# Patient Record
Sex: MALE
[De-identification: names, ages, dates, MRNs, and addresses within clinical notes are randomized per-mention and may not be internally consistent; named-entity substitution may affect disease eponyms.]

---

## 2018-07-01 ENCOUNTER — HOSPITAL ENCOUNTER (EMERGENCY)
Dept: HOSPITAL 97 - ER | Age: 43
Discharge: HOME | End: 2018-07-01
Payer: COMMERCIAL

## 2018-07-01 DIAGNOSIS — E78.00: ICD-10-CM

## 2018-07-01 DIAGNOSIS — R42: Primary | ICD-10-CM

## 2018-07-01 LAB
ALBUMIN SERPL BCP-MCNC: 4.4 G/DL (ref 3.4–5)
ALP SERPL-CCNC: 63 U/L (ref 45–117)
ALT SERPL W P-5'-P-CCNC: 100 U/L (ref 12–78)
AST SERPL W P-5'-P-CCNC: 73 U/L (ref 15–37)
BUN BLD-MCNC: 13 MG/DL (ref 7–18)
CKMB CREATINE KINASE MB: 2 NG/ML (ref 0.3–3.6)
GLUCOSE SERPLBLD-MCNC: 91 MG/DL (ref 74–106)
HCT VFR BLD CALC: 46.8 % (ref 39.6–49)
INR BLD: 1.09
LYMPHOCYTES # SPEC AUTO: 1.7 K/UL (ref 0.7–4.9)
MAGNESIUM SERPL-MCNC: 2.2 MG/DL (ref 1.8–2.4)
MCH RBC QN AUTO: 27.7 PG (ref 27–35)
MCV RBC: 85.5 FL (ref 80–100)
NT-PROBNP SERPL-MCNC: 9 PG/ML (ref ?–125)
PMV BLD: 9.5 FL (ref 7.6–11.3)
POTASSIUM SERPL-SCNC: 4.2 MMOL/L (ref 3.5–5.1)
RBC # BLD: 5.48 M/UL (ref 4.33–5.43)

## 2018-07-01 PROCEDURE — 84484 ASSAY OF TROPONIN QUANT: CPT

## 2018-07-01 PROCEDURE — 71045 X-RAY EXAM CHEST 1 VIEW: CPT

## 2018-07-01 PROCEDURE — 96374 THER/PROPH/DIAG INJ IV PUSH: CPT

## 2018-07-01 PROCEDURE — 80076 HEPATIC FUNCTION PANEL: CPT

## 2018-07-01 PROCEDURE — 83880 ASSAY OF NATRIURETIC PEPTIDE: CPT

## 2018-07-01 PROCEDURE — 85730 THROMBOPLASTIN TIME PARTIAL: CPT

## 2018-07-01 PROCEDURE — 36415 COLL VENOUS BLD VENIPUNCTURE: CPT

## 2018-07-01 PROCEDURE — 81003 URINALYSIS AUTO W/O SCOPE: CPT

## 2018-07-01 PROCEDURE — 93005 ELECTROCARDIOGRAM TRACING: CPT

## 2018-07-01 PROCEDURE — 83735 ASSAY OF MAGNESIUM: CPT

## 2018-07-01 PROCEDURE — 99285 EMERGENCY DEPT VISIT HI MDM: CPT

## 2018-07-01 PROCEDURE — 82550 ASSAY OF CK (CPK): CPT

## 2018-07-01 PROCEDURE — 85025 COMPLETE CBC W/AUTO DIFF WBC: CPT

## 2018-07-01 PROCEDURE — 80048 BASIC METABOLIC PNL TOTAL CA: CPT

## 2018-07-01 PROCEDURE — 82553 CREATINE MB FRACTION: CPT

## 2018-07-01 PROCEDURE — 85610 PROTHROMBIN TIME: CPT

## 2018-07-01 NOTE — RAD REPORT
EXAM DESCRIPTION:  RAD - Chest Single View - 7/1/2018 2:09 pm

 

CLINICAL HISTORY:  dizziness;SOB

Chest pain.

 

COMPARISON:  <Comparisons>

 

FINDINGS:  Portable technique limits examination quality.

 

The lungs are grossly clear. The heart is normal in size. No displaced fractures.

 

IMPRESSION:  No acute intrathoracic process suspected.

## 2018-07-01 NOTE — EKG
Test Date:    2018-07-01               Test Time:    13:35:17

Technician:   GRACIELA                                    

                                                     

MEASUREMENT RESULTS:                                       

Intervals:                                           

Rate:         86                                     

VT:           164                                    

QRSD:         88                                     

QT:           376                                    

QTc:          449                                    

Axis:                                                

P:            40                                     

VT:           164                                    

QRS:          34                                     

T:            19                                     

                                                     

INTERPRETIVE STATEMENTS:                                       

                                                     

Normal sinus rhythm with sinus arrhythmia

Normal ECG

No previous ECG available for comparison



Electronically Signed On 07-01-18 21:58:48 CDT by Shahram Charles

## 2018-07-01 NOTE — ER
Nurse's Notes                                                                                     

 [facility]                                                                

Name: Jose Carmen                                                                           

Age: 43 yrs                                                                                       

Sex: Male                                                                                         

: 1975                                                                                   

MRN: [id number]                                                                                   

Arrival Date: 2018                                                                          

Time: 12:28                                                                                       

Account#: [id number]                                                                            

Bed 16                                                                                            

Private MD: Out, [facility]                                                                    

Diagnosis: Dizziness                                                                              

                                                                                                  

Presentation:                                                                                     

                                                                                             

12:34 Presenting complaint: Wife states: "Hes been feeling bad this morning, like he can't    lk1 

      breath properly.". Transition of care: patient was not received from another setting of     

      care. Onset of symptoms was 2018 at 09:00. Risk Assessment: Do you want to         

      hurt yourself or someone else? Patient reports no desire to harm self or others.            

      Initial Sepsis Screen: Does the patient meet any 2 criteria? No. Patient's initial          

      sepsis screen is negative. Does the patient have a suspected source of infection? No.       

      Patient's initial sepsis screen is negative. Care prior to arrival: None.                   

12:34 Method Of Arrival: Ambulatory                                                           lk1 

12:34 Acuity: NELIA 3                                                                           lk1 

                                                                                                  

Triage Assessment:                                                                                

12:40 Respiratory: the patient has mild shortness of breath.                                  rb1 

12:40 Respiratory: Reports shortness of breath since this morning.                            rb1 

15:13 Respiratory: Onset: The symptoms/episode began/occurred.                                rb1 

                                                                                                  

Historical:                                                                                       

- Allergies:                                                                                      

12:35 No Known Allergies;                                                                     lk1 

- PMHx:                                                                                           

12:35 High Cholesterol;                                                                       lk1 

- PSHx:                                                                                           

12:35 Carpal Tunnel Repair;                                                                   lk1 

                                                                                                  

- Immunization history:: Adult Immunizations up to date.                                          

- Social history:: Smoking status: Patient/guardian denies using tobacco.                         

- Ebola Screening: : No symptoms or risks identified at this time.                                

                                                                                                  

                                                                                                  

Screenin:40 Abuse screen: Denies threats or abuse. Nutritional screening: No deficits noted.        rb1 

      Tuberculosis screening: No symptoms or risk factors identified. Fall Risk None              

      identified.                                                                                 

                                                                                                  

Assessment:                                                                                       

12:40 General: Appears uncomfortable, obese, Behavior is calm, cooperative, Denies fever.     rb1 

      Neuro: Level of Consciousness is awake, alert, obeys commands, Oriented to person,          

      place, time, situation. Cardiovascular: Capillary refill < 3 seconds is brisk in            

      bilateral fingers. Respiratory: Airway is patent Respiratory effort is even, unlabored,     

      Respiratory pattern is regular, symmetrical, Breath sounds are clear bilaterally. GI:       

      No signs and/or symptoms were reported involving the gastrointestinal system. : No        

      signs and/or symptoms were reported regarding the genitourinary system. Derm: Skin is       

      dry, Skin is normal, Skin temperature is warm.                                              

12:40 Pain: Denies pain. Cardiovascular: Rhythm is regular.                                   rb1 

13:40 Reassessment: Patient appears in no apparent distress at this time. No changes from     rb1 

      previously documented assessment.                                                           

14:35 Reassessment: Patient appears in no apparent distress at this time. Patient and/or      rb1 

      family updated on plan of care and expected duration. Pain level reassessed. Patient is     

      alert, oriented x 3, equal unlabored respirations, skin warm/dry/pink. Patient states       

      feeling better.                                                                             

                                                                                                  

Vital Signs:                                                                                      

12:35  / 100; Pulse 99; Resp 18; Temp 97.1(TE); Pulse Ox 98% on R/A; Weight 113.4 kg    lk1 

      (R); Height 5 ft. 7 in. (170.18 cm) (R); Pain 0/10;                                         

13:55  / 92 RA Supine (auto/lg); Pulse 81 MON; Resp 20; Pulse Ox 99% ;                  jp3 

14:00  / 90 RA Sitting (auto/lg); Pulse 80 MON; Resp 19 S; Pulse Ox 100% ;              jp3 

14:05  / 93 RA Standing (auto/lg); Pulse 82 MON; Resp 19 S; Pulse Ox 100% ;             jp3 

15:00  / 89; Pulse 97; Resp 19; Pulse Ox 100% on R/A;                                   rb1 

12:35 Body Mass Index 39.16 (113.40 kg, 170.18 cm)                                            lk1 

                                                                                                  

ED Course:                                                                                        

12:28 Patient arrived in ED.                                                                  sb2 

12:28 Out, of Town is Private Physician.                                                      sb2 

12:35 Triage completed.                                                                       lk1 

12:37 Arm band placed on right wrist.                                                         lk1 

12:39 Al Matos PA is PHCP.                                                                cp  

12:39 Amadeo Patiño MD is Attending Physician.                                              cp  

12:40 Patient has correct armband on for positive identification. Bed in low position. Call   rb1 

      light in reach. Side rails up X 1. Cardiac monitor on. Pulse ox on. NIBP on.                

12:56 Pearl Regalado, RN is Primary Nurse.                                                   rb1 

13:05 Missed attempt(s): 22 gauge in left forearm.                                            jp3 

13:15 Initial lab(s) drawn, by me, sent to lab. Inserted saline lock: 22 gauge in left        jp3 

      forearm, using aseptic technique. Blood collected.                                          

13:30 Urine collected: clean catch specimen, clear, loretta colored, EKG done, by ED staff,     jp3 

      reviewed by Al CATHERINE.                                                                  

14:09 X-ray completed. Portable x-ray completed in exam room. Patient tolerated procedure     la2 

      well.                                                                                       

14:09 XRAY Chest (1 view) In Process Unspecified.                                             EDMS

15:13 Removal of peripheral IV. Catheter intact, dressing applied.                            jp3 

15:14 No provider procedures requiring assistance completed. IV discontinued, intact,         rb1 

      bleeding controlled, No redness/swelling at site. Pressure dressing applied.                

                                                                                                  

Administered Medications:                                                                         

13:10 Drug: Meclizine 25 mg Route: PO;                                                        rb1 

13:30 Follow up: Response: No adverse reaction                                                rb1 

13:23 Drug: Zofran 4 mg Route: IVP; Site: left forearm;                                       rb1 

13:40 Follow up: Response: No adverse reaction                                                rb1 

14:55 Not Given (Provider discretion): NS 0.9% 1000 ml IV at 1 bolus Per protocol; 1000 mL    rb1 

      bolus                                                                                       

                                                                                                  

                                                                                                  

Outcome:                                                                                          

14:59 Discharge ordered by MD.                                                                cp  

15:14 Discharged to home ambulatory, with family.                                             rb1 

15:14 Condition: stable                                                                           

15:14 Discharge instructions given to patient, Instructed on discharge instructions, follow       

      up and referral plans. medication usage, Demonstrated understanding of instructions,        

      follow-up care, medications, Prescriptions given X 2.                                       

15:15 Patient left the ED.                                                                    rb1 

                                                                                                  

Signatures:                                                                                       

Dispatcher [employer]                           EDMS                                                 

Al Matos PA PA cp Barber, Rebecca, RN                     RN   rb1                                                  

Yadira Smalls RN                         RN   lk1                                                  

Ev Mayes                               la2                                                  

Zoie Rey                               sb2                                                  

Chau Hodge                              jp3                                                  

                                                                                                  

**************************************************************************************************

## 2018-07-01 NOTE — EDPHYS
Physician Documentation                                                                           

 [facility]                                                                

Name: Jose Carmen                                                                           

Age: 43 yrs                                                                                       

Sex: Male                                                                                         

: 1975                                                                                   

MRN: [id number]                                                                                   

Arrival Date: 2018                                                                          

Time: 12:28                                                                                       

Account#: [id number]                                                                            

Bed 16                                                                                            

Private MD: Out, [facility]                                                                    

ED Physician Amadeo Patiño                                                                       

HPI:                                                                                              

                                                                                             

12:51 This 43 yrs old  Male presents to ER via Ambulatory with complaints of          cp  

      Breathing Difficulty.                                                                       

                                                                                                  

Historical:                                                                                       

- Allergies:                                                                                      

12:35 No Known Allergies;                                                                     lk1 

- PMHx:                                                                                           

12:35 High Cholesterol;                                                                       lk1 

- PSHx:                                                                                           

12:35 Carpal Tunnel Repair;                                                                   lk1 

                                                                                                  

- Immunization history:: Adult Immunizations up to date.                                          

- Social history:: Smoking status: Patient/guardian denies using tobacco.                         

- Ebola Screening: : No symptoms or risks identified at this time.                                

                                                                                                  

                                                                                                  

ROS:                                                                                              

15:57 Constitutional: Negative for body aches, chills, fever, poor PO intake.                 cp  

15:57 Eyes: Negative for injury, pain, redness, and discharge.                                cp  

15:57 ENT: Negative for drainage from ear(s), ear pain, sore throat, difficulty swallowing,       

      difficulty handling secretions.                                                             

15:57 Cardiovascular: Negative for chest pain, edema, palpitations.                               

15:57 Respiratory: Positive for shortness of breath, Negative for cough, wheezing.                

15:57 Abdomen/GI: Negative for abdominal pain, vomiting, diarrhea, constipation, black/tarry      

      stool, rectal bleeding.                                                                     

15:57 Back: Negative for pain at rest, pain with movement, radiated pain.                         

15:57 Neuro: Positive for dizziness, Negative for altered mental status, headache, syncope,       

      near syncope, weakness.                                                                     

15:57 All other systems are negative.                                                             

                                                                                                  

Exam:                                                                                             

13:08 Constitutional: The patient appears in no acute distress, alert, awake,                 cp  

      non-diaphoretic, non-toxic, well developed, well nourished, uncomfortable.                  

13:08 Head/Face:  Normocephalic, atraumatic.                                                  cp  

13:08 Eyes: Periorbital structures: appear normal, Pupils: equal, round, and reactive to          

      light and accomodation, Extraocular movements: intact throughout, Conjunctiva: normal,      

      no exudate, no injection, Sclera: no appreciated abnormality, Lids and lashes: appear       

      normal, bilaterally.                                                                        

13:08 ENT: External ear(s): are unremarkable, Ear canal(s): are normal, clear, TM's: bulging,     

      is not appreciated, bilaterally, dullness, bilaterally, erythema, is not appreciated,       

      bilaterally, Nose: is normal, Mouth: Lips: moist, Oral mucosa: pink and intact, moist,      

      Posterior pharynx: is normal, airway is patent, no erythema, no exudate.                    

13:08 Neck: ROM/movement: is normal, is supple, without pain, no range of motions                 

      limitations, no meningismus, no nuchal rigidity, Lymph nodes: no appreciated                

      lymphadenopathy.                                                                            

13:08 Chest/axilla: Inspection: normal, Palpation: is normal, no crepitus, no tenderness.         

13:08 Cardiovascular: Rate: normal, Rhythm: regular, Pulses: Pulses are 2+ in right radial        

      artery and left radial artery. Heart sounds: murmur, not appreciated, rub, not              

      appreciated, gallop, not appreciated, Edema: is not appreciated, JVD: is not                

      appreciated.                                                                                

13:08 Respiratory: the patient does not display signs of respiratory distress,  Respirations:     

      normal, no use of accessory muscles, no retractions, no splinting, no tachypnea, Breath     

      sounds: are clear throughout, no decreased breath sounds, no stridor, no wheezing.          

13:08 Abdomen/GI: Inspection: obese Bowel sounds: active, all quadrants, Palpation: abdomen       

      is soft and non-tender, in all quadrants, rebound tenderness, is not appreciated,           

      voluntary guarding, is not appreciated, involuntary guarding, is not appreciated.           

13:08 Back: pain, is absent, ROM is normal.                                                       

13:08 Skin: cellulitis, is not appreciated, no rash present.                                      

13:08 Neuro: Orientation: to person, place \T\ time. Mentation: is normal, Cerebellar function:   

      is grossly normal, Motor: moves all fours, strength is normal, Sensation: no obvious        

      gross deficits.                                                                             

13:42 ECG was reviewed by the Attending Physician.                                            cp  

                                                                                                  

Vital Signs:                                                                                      

12:35  / 100; Pulse 99; Resp 18; Temp 97.1(TE); Pulse Ox 98% on R/A; Weight 113.4 kg    lk1 

      (R); Height 5 ft. 7 in. (170.18 cm) (R); Pain 0/10;                                         

13:55  / 92 RA Supine (auto/lg); Pulse 81 MON; Resp 20; Pulse Ox 99% ;                  jp3 

14:00  / 90 RA Sitting (auto/lg); Pulse 80 MON; Resp 19 S; Pulse Ox 100% ;              jp3 

14:05  / 93 RA Standing (auto/lg); Pulse 82 MON; Resp 19 S; Pulse Ox 100% ;             jp3 

15:00  / 89; Pulse 97; Resp 19; Pulse Ox 100% on R/A;                                   rb1 

12:35 Body Mass Index 39.16 (113.40 kg, 170.18 cm)                                            lk1 

                                                                                                  

MDM:                                                                                              

12:40 Patient medically screened.                                                             cp  

13:00 Differential diagnosis: Anemia asthma, pneumonia, Unstable Angina cardiac arrythmia,    cp  

      electrolyte abnormality, dehydration, anxiety.                                              

14:55 Data reviewed: vital signs, nurses notes, lab test result(s), EKG, radiologic studies,  cp  

      plain films, and as a result, I will discharge patient. ED course: VSS. Patient reports     

      he is feeling better.                                                                       

14:55 Test interpretation: by ED physician or midlevel provider: ECG, plain radiologic        cp  

      studies.                                                                                    

14:55 Counseling: I had a detailed discussion with the patient and/or guardian regarding: the cp  

      historical points, exam findings, and any diagnostic results supporting the                 

      discharge/admit diagnosis, lab results, radiology results, the need for outpatient          

      follow up, a family practitioner, to return to the emergency department if symptoms         

      worsen or persist or if there are any questions or concerns that arise at home.             

      Response to treatment: the patient's symptoms have markedly improved after treatment,       

      and as a result, I will discharge patient.                                                  

                                                                                                  

                                                                                             

12:50 Order name: Ckmb; Complete Time: 14:29                                                    

                                                                                             

12:50 Order name: CPK; Complete Time: 14:29                                                   cp  

                                                                                             

14:29 Interpretation: Abnormal: .                                                        

                                                                                             

12:50 Order name: Basic Metabolic Panel; Complete Time: 14:29                                   

                                                                                             

14:29 Interpretation: Normal except: GFR 81.                                                    

                                                                                             

12:50 Order name: CBC with Diff; Complete Time: 13:59                                           

                                                                                             

13:59 Interpretation: Normal except: WBC 11.9; RBC 5.48; BREANNA% 78.2; LYM% 14.0; NEUT A 9.3.      

                                                                                             

12:50 Order name: LFT's; Complete Time: 14:29                                                 cp  

                                                                                             

14:29 Interpretation: Normal except: AST 73; ; TP 8.7; GLOB 4.3; A/G 1.0.              cp  

                                                                                             

12:50 Order name: Magnesium; Complete Time: 14:29                                             cp  

                                                                                             

12:50 Order name: NT PRO-BNP; Complete Time: 14:29                                            cp  

                                                                                             

12:50 Order name: PT-INR; Complete Time: 13:59                                                cp  

                                                                                             

12:50 Order name: Ptt, Activated; Complete Time: 13:59                                        cp  

                                                                                             

12:50 Order name: Troponin (emerg Dept Use Only); Complete Time: 13:59                        cp  

                                                                                             

13:59 Interpretation: Within normal limits: TROPED < 0.02.                                    cp  

                                                                                             

12:50 Order name: XRAY Chest (1 view); Complete Time: 14:29                                   cp  

                                                                                             

13:56 Order name: Urine Dipstick--Ancillary (enter results)                                   em1 

                                                                                             

12:50 Order name: Orthostatics; Complete Time: 14:15                                          cp  

                                                                                             

12:50 Order name: EKG; Complete Time: 12:51                                                   cp  

                                                                                             

12:50 Order name: Cardiac monitoring; Complete Time: 13:24                                    cp  

                                                                                             

12:50 Order name: EKG - Nurse/Tech; Complete Time: 14:15                                      cp  

                                                                                             

12:50 Order name: IV Saline Lock; Complete Time: 13:23                                        cp  

                                                                                             

12:50 Order name: Labs collected and sent; Complete Time: 13:23                               cp  

                                                                                             

12:50 Order name: O2 Per Protocol; Complete Time: 13:23                                       cp  

                                                                                             

12:50 Order name: O2 Sat Monitoring; Complete Time: 13:23                                     cp  

                                                                                             

12:50 Order name: Urine Dipstick-Ancillary (obtain specimen); Complete Time: 13:52            cp  

                                                                                                  

EC:42 Rate is 86 beats/min. Rhythm is regular. DE interval is normal. QRS interval is normal. cp  

      QT interval is normal. Interpreted by me. Reviewed by me.                                   

                                                                                                  

Administered Medications:                                                                         

13:10 Drug: Meclizine 25 mg Route: PO;                                                        rb1 

13:30 Follow up: Response: No adverse reaction                                                rb1 

13:23 Drug: Zofran 4 mg Route: IVP; Site: left forearm;                                       rb1 

13:40 Follow up: Response: No adverse reaction                                                rb1 

14:55 Not Given (Provider discretion): NS 0.9% 1000 ml IV at 1 bolus Per protocol; 1000 mL    rb1 

      bolus                                                                                       

                                                                                                  

                                                                                                  

Disposition:                                                                                      

18:33 Co-signature as Attending Physician, Amadeo Patiño MD.                                    

                                                                                                  

Disposition:                                                                                      

18 14:59 Discharged to Home. Impression: Dizziness.                                         

- Condition is Stable.                                                                            

- Discharge Instructions: Dizziness.                                                              

- Prescriptions for Meclizine 25 mg Oral Tablet - take 1 tablet by ORAL route every 8             

  hours As needed; 30 tablet. Zofran 4 mg Oral Tablet - take 1 tablet by ORAL route               

  every 12 hours As needed; 20 tablet.                                                            

- Medication Reconciliation Form, Thank You Letter, Antibiotic Education, Prescription            

  Opioid Use form.                                                                                

- Follow up: Private Physician; When: 1 - 2 days; Reason: Recheck today's complaints.             

- Problem is new.                                                                                 

- Symptoms have improved.                                                                         

                                                                                                  

                                                                                                  

                                                                                                  

Signatures:                                                                                       

Dispatcher [employer]                           EDMS                                                 

Al Mtaos PA PA cp Barber, Rebecca, RN                     RN   rb1                                                  

Yadira Smalls RN                         RN   lk1                                                  

Amadeo Patiño MD MD                                                      

                                                                                                  

Corrections: (The following items were deleted from the chart)                                    

15:15 14:59 2018 14:59 Discharged to Home. Impression: Dizziness. Condition is Stable.  rb1 

      Forms are Medication Reconciliation Form, Thank You Letter, Antibiotic Education,           

      Prescription Opioid Use. Follow up: Private Physician; When: 1 - 2 days; Reason:            

      Recheck today's complaints. Problem is new. Symptoms have improved. cp                      

                                                                                                  

**************************************************************************************************

## 2022-12-29 ENCOUNTER — APPOINTMENT (RX ONLY)
Dept: URBAN - METROPOLITAN AREA CLINIC 5 | Facility: CLINIC | Age: 47
Setting detail: DERMATOLOGY
End: 2022-12-29

## 2022-12-29 DIAGNOSIS — L82.1 OTHER SEBORRHEIC KERATOSIS: ICD-10-CM

## 2022-12-29 DIAGNOSIS — L91.8 OTHER HYPERTROPHIC DISORDERS OF THE SKIN: ICD-10-CM

## 2022-12-29 DIAGNOSIS — L83 ACANTHOSIS NIGRICANS: ICD-10-CM

## 2022-12-29 PROCEDURE — 99202 OFFICE O/P NEW SF 15 MIN: CPT

## 2022-12-29 PROCEDURE — ? COUNSELING

## 2022-12-29 PROCEDURE — ? SKIN TAG REMOVAL (COSMETIC)

## 2022-12-29 ASSESSMENT — LOCATION SIMPLE DESCRIPTION DERM
LOCATION SIMPLE: LEFT EYELID
LOCATION SIMPLE: RIGHT EYEBROW
LOCATION SIMPLE: LEFT POSTERIOR AXILLA
LOCATION SIMPLE: NECK
LOCATION SIMPLE: LEFT CHEEK
LOCATION SIMPLE: LEFT FOREHEAD
LOCATION SIMPLE: LEFT ANTERIOR NECK
LOCATION SIMPLE: RIGHT CHEEK
LOCATION SIMPLE: RIGHT ANTERIOR NECK
LOCATION SIMPLE: LEFT TEMPLE

## 2022-12-29 ASSESSMENT — LOCATION ZONE DERM
LOCATION ZONE: EYELID
LOCATION ZONE: AXILLAE
LOCATION ZONE: NECK
LOCATION ZONE: FACE

## 2022-12-29 ASSESSMENT — LOCATION DETAILED DESCRIPTION DERM
LOCATION DETAILED: RIGHT INFERIOR CENTRAL MALAR CHEEK
LOCATION DETAILED: RIGHT SUPERIOR LATERAL NECK
LOCATION DETAILED: RIGHT MEDIAL MALAR CHEEK
LOCATION DETAILED: LEFT INFERIOR TEMPLE
LOCATION DETAILED: LEFT CENTRAL POSTERIOR NECK
LOCATION DETAILED: LEFT CLAVICULAR NECK
LOCATION DETAILED: LEFT INFERIOR LATERAL NECK
LOCATION DETAILED: RIGHT CLAVICULAR NECK
LOCATION DETAILED: LEFT INFERIOR LATERAL FOREHEAD
LOCATION DETAILED: LEFT SUPERIOR ANTERIOR NECK
LOCATION DETAILED: LEFT POSTERIOR AXILLA
LOCATION DETAILED: RIGHT CENTRAL LATERAL NECK
LOCATION DETAILED: LEFT SUPERIOR LATERAL MALAR CHEEK
LOCATION DETAILED: RIGHT LATERAL EYEBROW
LOCATION DETAILED: LEFT MEDIAL CANTHUS

## 2022-12-29 NOTE — PROCEDURE: SKIN TAG REMOVAL (COSMETIC)
Consent: Written consent obtained and the risks of skin tag removal was reviewed with the patient including but not limited to bleeding, pigmentary change, infection, pain, and remote possibility of scarring.
Anesthesia Volume In Cc: 1
Removed With: scissors
Price (Use Numbers Only, No Special Characters Or $): 200
Detail Level: Detailed
Anesthesia Type: 1% lidocaine with epinephrine